# Patient Record
(demographics unavailable — no encounter records)

---

## 2019-10-28 NOTE — NUR
10/28/19 Carmen Flynn
LATE ENTRY
O2 INCREASED TO 5L NC AT BEGINING OF CASE. AFTER FIRST DOSE OF
PROPOFOL 30MG PT'S O2 SATURATION DROPPED TO 91%. O2 MAINTAINED AT 97%
THROUGHOUT PROCEDURE. SATURATION ON ROOM AIR AT END OF PROCEDURE WAS
98%.

## 2019-10-28 NOTE — NUR
10/28/19 1458 Carmen Ortiz
DISCHARGE TEACHING COMPLETED WITH PT AND SPOUSE. TOLERATING PO FLUIDS
WELL. INSTRUCTED TO INCREASE FIBER PER DR. PRITCHETT. VSS.